# Patient Record
Sex: FEMALE | Race: WHITE | NOT HISPANIC OR LATINO | Employment: FULL TIME | URBAN - METROPOLITAN AREA
[De-identification: names, ages, dates, MRNs, and addresses within clinical notes are randomized per-mention and may not be internally consistent; named-entity substitution may affect disease eponyms.]

---
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
## 2020-12-28 ENCOUNTER — OFFICE VISIT (OUTPATIENT)
Dept: URGENT CARE | Facility: CLINIC | Age: 20
End: 2020-12-28
Payer: MEDICAID

## 2020-12-28 VITALS
HEART RATE: 57 BPM | BODY MASS INDEX: 22.08 KG/M2 | OXYGEN SATURATION: 100 % | DIASTOLIC BLOOD PRESSURE: 75 MMHG | RESPIRATION RATE: 18 BRPM | TEMPERATURE: 98 F | SYSTOLIC BLOOD PRESSURE: 116 MMHG | WEIGHT: 120 LBS | HEIGHT: 62 IN

## 2020-12-28 DIAGNOSIS — H16.002 CORNEAL ULCERATION, LEFT: Primary | ICD-10-CM

## 2020-12-28 DIAGNOSIS — Z97.3 USES CONTACT LENSES: ICD-10-CM

## 2020-12-28 PROCEDURE — 99203 OFFICE O/P NEW LOW 30 MIN: CPT | Mod: S$GLB,,, | Performed by: PHYSICIAN ASSISTANT

## 2020-12-28 PROCEDURE — 99203 PR OFFICE/OUTPT VISIT, NEW, LEVL III, 30-44 MIN: ICD-10-PCS | Mod: S$GLB,,, | Performed by: PHYSICIAN ASSISTANT

## 2020-12-28 RX ORDER — OFLOXACIN 3 MG/ML
1 SOLUTION/ DROPS OPHTHALMIC 4 TIMES DAILY
Qty: 10 ML | Refills: 0 | Status: SHIPPED | OUTPATIENT
Start: 2020-12-28 | End: 2021-01-04

## 2020-12-28 NOTE — PROGRESS NOTES
"Subjective:       Patient ID: Sol Nolan is a 20 y.o. female.    Vitals:  height is 5' 2" (1.575 m) and weight is 54.4 kg (120 lb). Her temperature is 97.5 °F (36.4 °C). Her blood pressure is 116/75 and her pulse is 57 (abnormal). Her respiration is 18 and oxygen saturation is 100%.     Chief Complaint: Eye Problem    19yo contact lens wearer states she woke up this morning with left eye discomfort, which has progressively worsened throughout the day. States not eye is extremely painful, red, watering, and is associated with blurry vision and light sensitivity. States she has tried OTC eye drops with no relief. Denies known injury/truama or FB    Eye Problem   The left eye is affected. This is a new problem. The current episode started today. The problem occurs constantly. The problem has been gradually worsening. The injury mechanism is unknown. The pain is at a severity of 7/10. The pain is moderate. There is no known exposure to pink eye. She wears contacts. Associated symptoms include blurred vision, eye redness and photophobia. Pertinent negatives include no eye discharge, double vision, fever, foreign body sensation, itching, nausea or vomiting. She has tried eye drops for the symptoms. The treatment provided no relief.       Constitution: Negative for chills, sweating, fatigue, fever and generalized weakness.   HENT: Negative for ear pain, congestion, sinus pain, sinus pressure, sore throat and trouble swallowing.    Neck: Negative for neck pain and neck stiffness.   Cardiovascular: Negative for chest pain and palpitations.   Eyes: Positive for eye pain, eye redness, photophobia and blurred vision. Negative for eye trauma, foreign body in eye, eye discharge, eye itching, vision loss, double vision and eyelid swelling.   Respiratory: Negative for cough and sputum production.    Gastrointestinal: Negative for abdominal pain, nausea, vomiting and diarrhea.   Genitourinary: Negative for history of kidney " stones.   Musculoskeletal: Negative for muscle ache.   Skin: Negative for rash.   Allergic/Immunologic: Negative for seasonal allergies, itching and sneezing.   Neurological: Negative for headaches, numbness and tingling.       Objective:      Physical Exam   Constitutional: She is oriented to person, place, and time. She appears well-developed.  Non-toxic appearance. She does not appear ill. No distress.      Comments:Patient is sitting on exam table in no acute distress. Nontoxic appearing. AAOx3   HENT:   Head: Normocephalic and atraumatic.   Ears:   Right Ear: External ear normal.   Left Ear: External ear normal.   Nose: Nose normal.   Mouth/Throat: Oropharynx is clear and moist.   Eyes: Pupils are equal, round, and reactive to light. EOM and lids are normal. Lids are everted and swept, no foreign bodies found. Right eye exhibits no discharge and no exudate. No foreign body present in the right eye. Left eye exhibits no discharge and no exudate. No foreign body present in the left eye. Right conjunctiva is not injected. Left conjunctiva is injected. Right eye exhibits normal extraocular motion. Left eye exhibits normal extraocular motion.   Slit lamp exam:       The left eye shows corneal ulcer.       Neck: Trachea normal, full passive range of motion without pain and phonation normal. Neck supple.   Abdominal: Normal appearance.   Musculoskeletal: Normal range of motion.   Neurological: She is alert and oriented to person, place, and time.   Skin: Skin is warm, dry, intact and not diaphoretic. Psychiatric: Her speech is normal and behavior is normal. Judgment and thought content normal.   Nursing note and vitals reviewed.        Clinical presentation consistent with corneal ulceration. Will start on ofloxacin and urgency Ophthalmology referral placed. Stressed importance of close follow up. Advised on ER precautions. Answered all patient questions. Patient verbalized understanding and voiced agreement with  current treatment plan.      Assessment:       1. Corneal ulceration, left    2. Uses contact lenses        Plan:         Corneal ulceration, left  -     ofloxacin (OCUFLOX) 0.3 % ophthalmic solution; Place 1 drop into the left eye 4 (four) times daily. for 7 days  Dispense: 10 mL; Refill: 0  -     Ambulatory referral/consult to Ophthalmology    Uses contact lenses  -     Ambulatory referral/consult to Ophthalmology      Patient Instructions     If your condition worsens or fails to improve we recommend that you receive another evaluation at the ER immediately or contact your PCP to discuss your concerns or return here. You must understand that you've received an urgent care treatment only and that you may be released before all your medical problems are known or treated. You the patient will arrange for followup care as instructed.     Keep eyes cleaned.  Cool compresses to affected eye.     Use the eye drops as prescribed.      Do not wear your contact lens ( if you use them) for at least 5 days after you stop having symptoms and are rechecked by your doctor.   Throw away the contacts, contact solution and carrying case you were using and start with new material.     Follow up with eye specialist in the next 24-48 hours as we have discussed. You can call [phone] to schedule an appointment.    If you develop increase eye symptoms or change in your vision seek medical care immediately either with your ophthalomologist or the ER.           Understanding Corneal Ulcer    A corneal ulcer is an open sore on your cornea. In most cases its caused by an infection. The cornea is the eyes outermost layer. It is a clear, strong layer on the front of your eye. It helps protect your eye from dirt and germs. It also helps control how light enters your eye. Infection or injury to your cornea can cause an ulcer to form. Corneal ulcers can happen in people of any age. If not treated, a corneal ulcer can lead to loss of  eyesight and even blindness.  What causes a corneal ulcer?  Many things can cause a corneal ulcer. These include:  · Bacterial infections, such as Staphylococcus  · Viral infections, such as herpes  · Fungal infections  · Parasitic infections  · Scrapes or burns on the cornea  · Conditions that dry out the cornea, such as dry eye or Bells palsy  · Autoimmune diseases, such as rheumatoid arthritis  · Injury to the cornea  · Ulcers from reduced blood flow, such as from diabetes  · Allergies causing inflammation of the eyes  · Vitamin A deficiency (rare in the [place]  If you use contact lenses, you are more at risk for a corneal ulcer if you:  · Wear soft contact lenses  · Wear extended-wear contact lenses  · Wear your lenses for longer than recommended  · Dont care for your lenses and lens case as told to  · Put  your contact lenses in water  Symptoms of a corneal ulcer  Symptoms of a corneal ulcer can include:  · Severe eye pain  · A feeling of something in your eye  · Tearing  · Discharge from your eye  · Sensitivity to light  · Blurred vision  · Eye redness  · Swelling of your eyelids  · A white spot on your cornea  Diagnosing a corneal ulcer  You may see an eye doctor (ophthalmologist). He or she will ask about your past health and your symptoms. You will be given an eye exam. Your eyesight will be checked in both eyes. A dye may be put into your eye to help your eye doctor look at your cornea. He or she may use an ophthalmoscope, which uses a light to look into the back of your eye. You may be asked to look into a large tool called a slit-lamp. This lets your eye doctor see your eye in more detail.  Your cornea may be scraped and the sample sent to a lab. This is done to check for infection. If you wear contact lenses, your contact lenses may be sent to the lab too. You may also have blood tests to help find the cause of your corneal ulcer. Your treatment may vary depending on the cause.  Date Last Reviewed:  2/1/2017  © 7895-9892 [employer]. [address] [place] [address], [place] [address]. All rights reserved. This information is not intended as a substitute for professional medical care. Always follow your healthcare professional's instructions.

## 2020-12-28 NOTE — LETTER
December 28, 2020      [facility] [address], [place] [address]  Phone: [phone]  Fax: [phone]       Patient: Sol Nolan   YOB: 2000  Date of Visit: 12/28/2020    To Whom It May Concern:    Brandin Nolan  was at [facility] on 12/28/2020. She may return to work/school on 12/30/2020 with no restrictions. If you have any questions or concerns, or if I can be of further assistance, please do not hesitate to contact me.    Sincerely,    Jacinto Dawn PA-C

## 2020-12-29 ENCOUNTER — OFFICE VISIT (OUTPATIENT)
Dept: OPHTHALMOLOGY | Facility: CLINIC | Age: 20
End: 2020-12-29
Attending: OPHTHALMOLOGY
Payer: MEDICAID

## 2020-12-29 ENCOUNTER — TELEPHONE (OUTPATIENT)
Dept: OPHTHALMOLOGY | Facility: CLINIC | Age: 20
End: 2020-12-29

## 2020-12-29 DIAGNOSIS — H18.829 KERATITIS SECONDARY TO CONTACT LENS: Primary | ICD-10-CM

## 2020-12-29 DIAGNOSIS — H16.8 KERATITIS SECONDARY TO CONTACT LENS: Primary | ICD-10-CM

## 2020-12-29 PROCEDURE — 92002 PR EYE EXAM, NEW PATIENT,INTERMED: ICD-10-PCS | Mod: S$PBB,,, | Performed by: OPHTHALMOLOGY

## 2020-12-29 PROCEDURE — 99212 OFFICE O/P EST SF 10 MIN: CPT | Mod: PBBFAC | Performed by: OPHTHALMOLOGY

## 2020-12-29 PROCEDURE — 92002 INTRM OPH EXAM NEW PATIENT: CPT | Mod: S$PBB,,, | Performed by: OPHTHALMOLOGY

## 2020-12-29 PROCEDURE — 99999 PR PBB SHADOW E&M-EST. PATIENT-LVL II: CPT | Mod: PBBFAC,,, | Performed by: OPHTHALMOLOGY

## 2020-12-29 PROCEDURE — 99999 PR PBB SHADOW E&M-EST. PATIENT-LVL II: ICD-10-PCS | Mod: PBBFAC,,, | Performed by: OPHTHALMOLOGY

## 2020-12-29 NOTE — PROGRESS NOTES
HPI     k ulcer      Additional comments: left eye               Blurred Vision      Additional comments: left eye               Comments     DLS 12/28/2020 K ulcer left eye    Pt reports x 2 days red, photophobia OS    Still irritated OS but no pain     gtts    Ofloxacin QID OS               Last edited by Silvana Soriano on 12/29/2020  3:48 PM. (History)            Assessment /Plan     For exam results, see Encounter Report.    Keratitis secondary to contact lens      No infiltrate or epi defect, just SPK mild  CTL holiday for 1 week, abx for 3 days

## 2020-12-29 NOTE — PATIENT INSTRUCTIONS
If your condition worsens or fails to improve we recommend that you receive another evaluation at the ER immediately or contact your PCP to discuss your concerns or return here. You must understand that you've received an urgent care treatment only and that you may be released before all your medical problems are known or treated. You the patient will arrange for followup care as instructed.     Keep eyes cleaned.  Cool compresses to affected eye.     Use the eye drops as prescribed.      Do not wear your contact lens ( if you use them) for at least 5 days after you stop having symptoms and are rechecked by your doctor.   Throw away the contacts, contact solution and carrying case you were using and start with new material.     Follow up with eye specialist in the next 24-48 hours as we have discussed. You can call [phone] to schedule an appointment.    If you develop increase eye symptoms or change in your vision seek medical care immediately either with your ophthalomologist or the ER.           Understanding Corneal Ulcer    A corneal ulcer is an open sore on your cornea. In most cases its caused by an infection. The cornea is the eyes outermost layer. It is a clear, strong layer on the front of your eye. It helps protect your eye from dirt and germs. It also helps control how light enters your eye. Infection or injury to your cornea can cause an ulcer to form. Corneal ulcers can happen in people of any age. If not treated, a corneal ulcer can lead to loss of eyesight and even blindness.  What causes a corneal ulcer?  Many things can cause a corneal ulcer. These include:  · Bacterial infections, such as Staphylococcus  · Viral infections, such as herpes  · Fungal infections  · Parasitic infections  · Scrapes or burns on the cornea  · Conditions that dry out the cornea, such as dry eye or Bells palsy  · Autoimmune diseases, such as rheumatoid arthritis  · Injury to the cornea  · Ulcers from reduced blood  flow, such as from diabetes  · Allergies causing inflammation of the eyes  · Vitamin A deficiency (rare in the [place]  If you use contact lenses, you are more at risk for a corneal ulcer if you:  · Wear soft contact lenses  · Wear extended-wear contact lenses  · Wear your lenses for longer than recommended  · Dont care for your lenses and lens case as told to  · Put  your contact lenses in water  Symptoms of a corneal ulcer  Symptoms of a corneal ulcer can include:  · Severe eye pain  · A feeling of something in your eye  · Tearing  · Discharge from your eye  · Sensitivity to light  · Blurred vision  · Eye redness  · Swelling of your eyelids  · A white spot on your cornea  Diagnosing a corneal ulcer  You may see an eye doctor (ophthalmologist). He or she will ask about your past health and your symptoms. You will be given an eye exam. Your eyesight will be checked in both eyes. A dye may be put into your eye to help your eye doctor look at your cornea. He or she may use an ophthalmoscope, which uses a light to look into the back of your eye. You may be asked to look into a large tool called a slit-lamp. This lets your eye doctor see your eye in more detail.  Your cornea may be scraped and the sample sent to a lab. This is done to check for infection. If you wear contact lenses, your contact lenses may be sent to the lab too. You may also have blood tests to help find the cause of your corneal ulcer. Your treatment may vary depending on the cause.  Date Last Reviewed: 2/1/2017  © 8978-3246 [employer]. [address] [place] [address], [place] [address]. All rights reserved. This information is not intended as a substitute for professional medical care. Always follow your healthcare professional's instructions.

## 2020-12-29 NOTE — TELEPHONE ENCOUNTER
Left message for patient to call to discuss current ocular issues and concerns.  Informed patient that we will be happy to schedule any testing and appointments as needed.   Told pt to call back at [phone]/ech

## 2021-02-12 ENCOUNTER — OFFICE VISIT (OUTPATIENT)
Dept: URGENT CARE | Facility: CLINIC | Age: 21
End: 2021-02-12
Payer: MEDICAID

## 2021-02-12 VITALS
HEIGHT: 62 IN | OXYGEN SATURATION: 99 % | WEIGHT: 110 LBS | DIASTOLIC BLOOD PRESSURE: 86 MMHG | BODY MASS INDEX: 20.24 KG/M2 | TEMPERATURE: 98 F | HEART RATE: 95 BPM | SYSTOLIC BLOOD PRESSURE: 134 MMHG | RESPIRATION RATE: 16 BRPM

## 2021-02-12 DIAGNOSIS — O21.9 VOMITING AFFECTING PREGNANCY: Primary | ICD-10-CM

## 2021-02-12 LAB
GLUCOSE SERPL-MCNC: 102 MG/DL (ref 70–110)
POC ANION GAP: 17 MMOL/L (ref 10–20)
POC BUN: 12 MMOL/L (ref 8–26)
POC CHLORIDE: 103 MMOL/L (ref 98–109)
POC CREATININE: 0.4 MG/DL (ref 0.6–1.3)
POC HEMATOCRIT: 43 %PCV (ref 37–47)
POC HEMOGLOBIN: 14.6 G/DL (ref 12.5–16)
POC ICA: 1.24 MMOL/L (ref 1.12–1.32)
POC POTASSIUM: 3.7 MMOL/L (ref 3.5–4.9)
POC SODIUM: 138 MMOL/L (ref 138–146)
POC TCO2: 22 MMOL/L (ref 24–29)

## 2021-02-12 PROCEDURE — 80047 POCT CHEMISTRY PANEL: ICD-10-PCS | Mod: QW,S$GLB,, | Performed by: NURSE PRACTITIONER

## 2021-02-12 PROCEDURE — 80047 BASIC METABLC PNL IONIZED CA: CPT | Mod: QW,S$GLB,, | Performed by: NURSE PRACTITIONER

## 2021-02-12 PROCEDURE — 99212 PR OFFICE/OUTPT VISIT, EST, LEVL II, 10-19 MIN: ICD-10-PCS | Mod: S$GLB,,, | Performed by: NURSE PRACTITIONER

## 2021-02-12 PROCEDURE — 99212 OFFICE O/P EST SF 10 MIN: CPT | Mod: S$GLB,,, | Performed by: NURSE PRACTITIONER

## 2021-02-12 RX ORDER — DOXYLAMINE SUCCINATE AND PYRIDOXINE HYDROCHLORIDE 10; 10 MG/1; MG/1
2 TABLET, DELAYED RELEASE ORAL NIGHTLY
Qty: 15 TABLET | Refills: 0 | Status: SHIPPED | OUTPATIENT
Start: 2021-02-12 | End: 2021-02-17

## 2021-02-15 ENCOUNTER — HOSPITAL ENCOUNTER (EMERGENCY)
Facility: HOSPITAL | Age: 21
Discharge: HOME | End: 2021-02-15
Attending: EMERGENCY MEDICINE
Payer: MEDICAID

## 2021-02-15 VITALS
TEMPERATURE: 98 F | BODY MASS INDEX: 20.12 KG/M2 | HEART RATE: 85 BPM | WEIGHT: 110 LBS | RESPIRATION RATE: 18 BRPM | DIASTOLIC BLOOD PRESSURE: 81 MMHG | OXYGEN SATURATION: 100 % | SYSTOLIC BLOOD PRESSURE: 114 MMHG

## 2021-02-15 DIAGNOSIS — H10.9 CONJUNCTIVITIS OF LEFT EYE, UNSPECIFIED CONJUNCTIVITIS TYPE: Primary | ICD-10-CM

## 2021-02-15 PROCEDURE — 25000003 PHARM REV CODE 250: Mod: ER | Performed by: NURSE PRACTITIONER

## 2021-02-15 PROCEDURE — 99284 EMERGENCY DEPT VISIT MOD MDM: CPT | Mod: ER

## 2021-02-15 RX ORDER — DEXTROMETHORPHAN HYDROBROMIDE, GUAIFENESIN 5; 100 MG/5ML; MG/5ML
650 LIQUID ORAL EVERY 8 HOURS
Qty: 20 TABLET | Refills: 0 | Status: SHIPPED | OUTPATIENT
Start: 2021-02-15

## 2021-02-15 RX ORDER — OFLOXACIN 3 MG/ML
2 SOLUTION/ DROPS OPHTHALMIC 4 TIMES DAILY
Qty: 10 ML | Refills: 0 | Status: SHIPPED | OUTPATIENT
Start: 2021-02-15

## 2021-02-15 RX ORDER — PROPARACAINE HYDROCHLORIDE 5 MG/ML
1 SOLUTION/ DROPS OPHTHALMIC
Status: COMPLETED | OUTPATIENT
Start: 2021-02-15 | End: 2021-02-15

## 2021-02-15 RX ORDER — IBUPROFEN 600 MG/1
600 TABLET ORAL EVERY 6 HOURS PRN
Qty: 20 TABLET | Refills: 0 | Status: SHIPPED | OUTPATIENT
Start: 2021-02-15

## 2021-02-15 RX ADMIN — FLUORESCEIN SODIUM 1 EACH: 1 STRIP OPHTHALMIC at 01:02

## 2021-02-15 RX ADMIN — PROPARACAINE HYDROCHLORIDE 1 DROP: 5 SOLUTION/ DROPS OPHTHALMIC at 01:02

## 2021-11-18 ENCOUNTER — HOSPITAL ENCOUNTER (EMERGENCY)
Facility: HOSPITAL | Age: 21
Discharge: HOME | End: 2021-11-18
Attending: EMERGENCY MEDICINE
Payer: MEDICAID

## 2021-11-18 VITALS
BODY MASS INDEX: 22.08 KG/M2 | DIASTOLIC BLOOD PRESSURE: 82 MMHG | WEIGHT: 120 LBS | HEART RATE: 68 BPM | HEIGHT: 62 IN | SYSTOLIC BLOOD PRESSURE: 117 MMHG | TEMPERATURE: 98 F | RESPIRATION RATE: 16 BRPM | OXYGEN SATURATION: 98 %

## 2021-11-18 DIAGNOSIS — R52 PAIN: ICD-10-CM

## 2021-11-18 DIAGNOSIS — S62.001A CLOSED NONDISPLACED FRACTURE OF SCAPHOID OF RIGHT WRIST, UNSPECIFIED PORTION OF SCAPHOID, INITIAL ENCOUNTER: Primary | ICD-10-CM

## 2021-11-18 LAB
B-HCG UR QL: NEGATIVE
CTP QC/QA: YES

## 2021-11-18 PROCEDURE — 29125 APPL SHORT ARM SPLINT STATIC: CPT | Mod: RT

## 2021-11-18 PROCEDURE — 81025 URINE PREGNANCY TEST: CPT | Performed by: EMERGENCY MEDICINE

## 2021-11-18 PROCEDURE — 99283 EMERGENCY DEPT VISIT LOW MDM: CPT | Mod: 25

## 2021-12-23 DIAGNOSIS — S62.024A: Primary | ICD-10-CM

## 2021-12-27 PROBLEM — S62.024A: Status: ACTIVE | Noted: 2021-12-27

## 2021-12-28 ENCOUNTER — CLINICAL SUPPORT (OUTPATIENT)
Dept: REHABILITATION | Facility: HOSPITAL | Age: 21
End: 2021-12-28
Payer: MEDICAID

## 2021-12-28 DIAGNOSIS — S62.024A CLOSED NONDISPLACED FRACTURE OF MIDDLE THIRD OF SCAPHOID BONE OF RIGHT WRIST, INITIAL ENCOUNTER: Primary | ICD-10-CM

## 2021-12-28 PROCEDURE — 97760 ORTHOTIC MGMT&TRAING 1ST ENC: CPT | Mod: PN

## 2021-12-28 PROCEDURE — L3806 WHFO W/JOINT(S) CUSTOM FAB: HCPCS | Mod: PN
